# Patient Record
Sex: FEMALE | Race: WHITE | NOT HISPANIC OR LATINO
[De-identification: names, ages, dates, MRNs, and addresses within clinical notes are randomized per-mention and may not be internally consistent; named-entity substitution may affect disease eponyms.]

---

## 2020-07-31 ENCOUNTER — APPOINTMENT (OUTPATIENT)
Dept: PLASTIC SURGERY | Facility: CLINIC | Age: 60
End: 2020-07-31

## 2021-05-11 ENCOUNTER — APPOINTMENT (OUTPATIENT)
Dept: DISASTER EMERGENCY | Facility: OTHER | Age: 61
End: 2021-05-11

## 2022-04-20 PROBLEM — Z00.00 ENCOUNTER FOR PREVENTIVE HEALTH EXAMINATION: Status: ACTIVE | Noted: 2022-04-20

## 2022-04-25 ENCOUNTER — APPOINTMENT (OUTPATIENT)
Dept: VASCULAR SURGERY | Facility: CLINIC | Age: 62
End: 2022-04-25
Payer: COMMERCIAL

## 2022-04-25 ENCOUNTER — NON-APPOINTMENT (OUTPATIENT)
Age: 62
End: 2022-04-25

## 2022-04-25 VITALS — HEART RATE: 65 BPM | DIASTOLIC BLOOD PRESSURE: 50 MMHG | SYSTOLIC BLOOD PRESSURE: 99 MMHG

## 2022-04-25 DIAGNOSIS — Z87.891 PERSONAL HISTORY OF NICOTINE DEPENDENCE: ICD-10-CM

## 2022-04-25 DIAGNOSIS — E06.3 AUTOIMMUNE THYROIDITIS: ICD-10-CM

## 2022-04-25 DIAGNOSIS — Z78.9 OTHER SPECIFIED HEALTH STATUS: ICD-10-CM

## 2022-04-25 DIAGNOSIS — F98.8 OTHER SPECIFIED BEHAVIORAL AND EMOTIONAL DISORDERS WITH ONSET USUALLY OCCURRING IN CHILDHOOD AND ADOLESCENCE: ICD-10-CM

## 2022-04-25 DIAGNOSIS — R51.9 HEADACHE, UNSPECIFIED: ICD-10-CM

## 2022-04-25 DIAGNOSIS — E03.9 HYPOTHYROIDISM, UNSPECIFIED: ICD-10-CM

## 2022-04-25 PROCEDURE — 99204 OFFICE O/P NEW MOD 45 MIN: CPT

## 2022-04-25 RX ORDER — ETODOLAC 400 MG/1
400 TABLET, FILM COATED, EXTENDED RELEASE ORAL
Refills: 0 | Status: ACTIVE | COMMUNITY

## 2022-04-25 RX ORDER — LISDEXAMFETAMINE DIMESYLATE 30 MG/1
30 CAPSULE ORAL
Refills: 0 | Status: ACTIVE | COMMUNITY

## 2022-04-25 RX ORDER — LEVOTHYROXINE SODIUM 125 UG/1
125 TABLET ORAL
Refills: 0 | Status: ACTIVE | COMMUNITY

## 2022-04-25 NOTE — ASSESSMENT
[FreeTextEntry1] : 61 year old female with right lower extremity pain  associated with varicose veins. I am uncertain if she  has venous insufficiency. I recommended that undergo a venous duplex. She will follow up when the results are available. Treatment will depend on the results of the duplex.

## 2022-04-25 NOTE — PHYSICAL EXAM
[JVD] : no jugular venous distention  [Normal Breath Sounds] : Normal breath sounds [Normal Rate and Rhythm] : normal rate and rhythm [2+] : left 2+ [Ankle Swelling (On Exam)] : not present [Ankle Swelling Bilaterally] : bilaterally  [Varicose Veins Of The Right Leg] : of the right leg [Varicose Veins Of Lower Extremities] : present [Ankle Swelling On The Right] : mild [] : bilaterally [Alert] : alert [Oriented to Person] : oriented to person [Oriented to Place] : oriented to place [Oriented to Time] : oriented to time [de-identified] : Awake and Alert [de-identified] : prominent varicose veins right shin > 3mm [de-identified] : Appropriate

## 2022-04-25 NOTE — HISTORY OF PRESENT ILLNESS
[FreeTextEntry1] : 61 year old female  presents to the office with a chief complaint of right lower extremity leg pain associated with varicose veins. She reports that her pain and swelling worsens with prolonged standing. She denies a history of DVT or SVT. She does not wear compression stockings. She denies a family history of varicose veins.\par

## 2022-04-25 NOTE — REVIEW OF SYSTEMS
[Fever] : no fever [Chills] : no chills [Lower Ext Edema] : no lower extremity edema [Limb Pain] : limb pain [Limb Swelling] : no limb swelling [Limb Weakness] : no limb weakness [Difficulty Walking] : no difficulty walking

## 2022-05-02 ENCOUNTER — APPOINTMENT (OUTPATIENT)
Dept: VASCULAR SURGERY | Facility: CLINIC | Age: 62
End: 2022-05-02
Payer: COMMERCIAL

## 2022-05-02 ENCOUNTER — APPOINTMENT (OUTPATIENT)
Dept: VASCULAR SURGERY | Facility: CLINIC | Age: 62
End: 2022-05-02

## 2022-05-02 DIAGNOSIS — I87.2 VENOUS INSUFFICIENCY (CHRONIC) (PERIPHERAL): ICD-10-CM

## 2022-05-02 PROCEDURE — 93970 EXTREMITY STUDY: CPT

## 2022-05-02 PROCEDURE — 99213 OFFICE O/P EST LOW 20 MIN: CPT

## 2022-05-02 NOTE — ASSESSMENT
[FreeTextEntry1] : 61 year old female with right lower extremity pain associated with varicose veins. She underwent a venous duplex which demonstrated no significant venous insufficiency. She has symptomatic varicose veins of the right lower extremity. She is an excellent candidate for stab phlebectomy on the right and sclerotherapy on the left. The risks and benefits of the procedure were discussed with the patient and she would like to proceed.

## 2022-05-02 NOTE — HISTORY OF PRESENT ILLNESS
[FreeTextEntry1] : 61 year old female  presents to the office with a chief complaint of right lower extremity leg pain associated with varicose veins. She reports that her pain and swelling worsens with prolonged standing. She denies a history of DVT or SVT. She does not wear compression stockings. She denies a family history of varicose veins.\par  [de-identified] : 62 yo female returns in follow up for symptomatic varicose veins and spider veins. She underwent a venous duplex and is here to discuss the results and additional treatment options.

## 2022-05-02 NOTE — REVIEW OF SYSTEMS
[Fever] : no fever [Chills] : no chills [Limb Pain] : limb pain [Lower Ext Edema] : no lower extremity edema [Limb Swelling] : no limb swelling [Limb Weakness] : no limb weakness [Difficulty Walking] : no difficulty walking

## 2022-05-02 NOTE — PHYSICAL EXAM
[JVD] : no jugular venous distention  [Normal Breath Sounds] : Normal breath sounds [Normal Rate and Rhythm] : normal rate and rhythm [2+] : left 2+ [Ankle Swelling (On Exam)] : not present [Ankle Swelling Bilaterally] : bilaterally  [Varicose Veins Of Lower Extremities] : present [Varicose Veins Of The Right Leg] : of the right leg [Ankle Swelling On The Right] : mild [] : bilaterally [Alert] : alert [Oriented to Person] : oriented to person [Oriented to Place] : oriented to place [Oriented to Time] : oriented to time [de-identified] : Awake and Alert [de-identified] : prominent varicose veins right shin > 3mm, spider veins left leg [de-identified] : Appropriate

## 2022-05-03 PROBLEM — I87.2 VENOUS INSUFFICIENCY OF BOTH LOWER EXTREMITIES: Status: ACTIVE | Noted: 2022-04-25

## 2022-10-12 ENCOUNTER — APPOINTMENT (OUTPATIENT)
Dept: VASCULAR SURGERY | Facility: CLINIC | Age: 62
End: 2022-10-12

## 2022-10-12 VITALS — HEART RATE: 72 BPM | DIASTOLIC BLOOD PRESSURE: 74 MMHG | SYSTOLIC BLOOD PRESSURE: 106 MMHG

## 2022-10-12 VITALS — SYSTOLIC BLOOD PRESSURE: 102 MMHG | DIASTOLIC BLOOD PRESSURE: 66 MMHG | HEART RATE: 76 BPM

## 2022-10-12 PROCEDURE — 37765 STAB PHLEB VEINS XTR 10-20: CPT

## 2022-10-12 NOTE — PROCEDURE
[FreeTextEntry1] : Stab phlebectomy right leg [FreeTextEntry2] : Varicose Veins [FreeTextEntry3] : The patient was seen in the waiting room where the consent was obtained. She was asked to stand and marks were made on the right leg in preparation for stab phlebectomy. She was then taken to the procedure room where a timeout was called to verify her identity and the laterality of the procedure. . Her leg was then prepped and draped in the standard fashion. The patient was then given an injection of 1% plain lidocaine over the previously marked stat phlebectomy sites. Micro  incisions were then made.  10 - 20 stab phlebectomy's were then performed in the standard fashion. The patient was given tumescence throughout the procedure. Once hemostasis was appropriate the incisions were reapproximated with nylon sutures.   Leg was wrapped in kerlix and an ace wrap. Patient was discharged in stable condition.\par

## 2022-10-17 ENCOUNTER — APPOINTMENT (OUTPATIENT)
Dept: VASCULAR SURGERY | Facility: CLINIC | Age: 62
End: 2022-10-17

## 2022-10-17 VITALS — DIASTOLIC BLOOD PRESSURE: 75 MMHG | HEART RATE: 78 BPM | SYSTOLIC BLOOD PRESSURE: 112 MMHG

## 2022-10-17 DIAGNOSIS — I78.1 NEVUS, NON-NEOPLASTIC: ICD-10-CM

## 2022-10-17 PROCEDURE — 99024 POSTOP FOLLOW-UP VISIT: CPT

## 2022-10-17 NOTE — DISCUSSION/SUMMARY
[FreeTextEntry1] : 62-year-old female status post stab phlebectomy of the right lower extremity for symptomatic varicose veins.  Patient is doing well postoperatively.  Her sutures were removed Steri-Strips placed.  She was instructed to continue to wear a compression stocking during the day for the next 2 weeks.  She will slowly return to her preprocedure activities.

## 2022-10-17 NOTE — PHYSICAL EXAM
[Ankle Swelling (On Exam)] : not present [Ankle Swelling On The Right] : of the right ankle [Alert] : alert [Oriented to Person] : oriented to person [Oriented to Place] : oriented to place [Oriented to Time] : oriented to time [de-identified] : Awake and Alert [de-identified] : Incisions healing appropriately, sutures removed steri strips placed, mild ecchymosis [de-identified] : Appropriate

## 2022-10-17 NOTE — REASON FOR VISIT
[de-identified] : s/p stab phlebectomy 10 - 20 incisions right leg [de-identified] : 62-year-old female status post stab phlebectomy for symptomatic varicose veins of the right lower extremity.  She reports she is doing well.  She denies fever or chills.  She reports minimal pain.

## 2022-11-07 ENCOUNTER — APPOINTMENT (OUTPATIENT)
Dept: VASCULAR SURGERY | Facility: CLINIC | Age: 62
End: 2022-11-07

## 2022-11-07 VITALS — HEART RATE: 83 BPM | DIASTOLIC BLOOD PRESSURE: 62 MMHG | SYSTOLIC BLOOD PRESSURE: 117 MMHG

## 2022-11-07 DIAGNOSIS — I83.811 VARICOSE VEINS OF RIGHT LOWER EXTREMITY WITH PAIN: ICD-10-CM

## 2022-11-07 PROCEDURE — 99213 OFFICE O/P EST LOW 20 MIN: CPT

## 2022-11-09 PROBLEM — I83.811 VARICOSE VEINS OF RIGHT LOWER EXTREMITY WITH PAIN: Status: ACTIVE | Noted: 2022-05-02

## 2022-11-09 NOTE — REVIEW OF SYSTEMS
[Limb Pain] : limb pain [Fever] : no fever [Chills] : no chills [Leg Claudication] : no intermittent leg claudication [Lower Ext Edema] : no lower extremity edema [Limb Swelling] : no limb swelling

## 2022-11-09 NOTE — PHYSICAL EXAM
[Ankle Swelling On The Right] : of the right ankle [Alert] : alert [Oriented to Person] : oriented to person [Oriented to Place] : oriented to place [Oriented to Time] : oriented to time [Ankle Swelling (On Exam)] : not present [de-identified] : Awake and Alert [de-identified] : Incisions healed - area of firmness just proximal to the ankle [de-identified] : Appropriate

## 2022-11-09 NOTE — HISTORY OF PRESENT ILLNESS
[FreeTextEntry1] : 62-year-old female status post stab phlebectomy for symptomatic varicose veins of the right lower extremity. She reports she is going to [place] for the winter next week and has pain in her distal leg. She wants to make sure that there is nothing wrong. She denies fever or chills.

## 2022-11-09 NOTE — ASSESSMENT
[FreeTextEntry1] : 63 yo female s/p stab phlebectomy for symptomatic varicose veins. She has an area of thrombophlebitis in the vein remnant. There is no erythema. I assured her that the area will diminish in time. She was instructed to take Motrin for pain as necessary. She will follow up with me when she returns from [place].